# Patient Record
Sex: FEMALE | Race: ASIAN | NOT HISPANIC OR LATINO | URBAN - METROPOLITAN AREA
[De-identification: names, ages, dates, MRNs, and addresses within clinical notes are randomized per-mention and may not be internally consistent; named-entity substitution may affect disease eponyms.]

---

## 2024-03-05 ENCOUNTER — EMERGENCY (EMERGENCY)
Facility: HOSPITAL | Age: 39
LOS: 1 days | Discharge: HOME | End: 2024-03-05
Attending: EMERGENCY MEDICINE
Payer: COMMERCIAL

## 2024-03-05 VITALS
SYSTOLIC BLOOD PRESSURE: 118 MMHG | WEIGHT: 181 LBS | HEIGHT: 67 IN | OXYGEN SATURATION: 99 % | RESPIRATION RATE: 18 BRPM | DIASTOLIC BLOOD PRESSURE: 77 MMHG | TEMPERATURE: 99 F | HEART RATE: 88 BPM

## 2024-03-05 VITALS
RESPIRATION RATE: 16 BRPM | SYSTOLIC BLOOD PRESSURE: 112 MMHG | HEART RATE: 77 BPM | DIASTOLIC BLOOD PRESSURE: 71 MMHG | OXYGEN SATURATION: 99 %

## 2024-03-05 LAB
ALBUMIN SERPL ELPH-MCNC: 4.5 G/DL — SIGNIFICANT CHANGE UP (ref 3.3–5)
ALP SERPL-CCNC: 101 U/L — SIGNIFICANT CHANGE UP (ref 40–120)
ALT FLD-CCNC: 72 U/L — HIGH (ref 10–45)
ANION GAP SERPL CALC-SCNC: 12 MMOL/L — SIGNIFICANT CHANGE UP (ref 5–17)
APTT BLD: 36.5 SEC — HIGH (ref 24.5–35.6)
AST SERPL-CCNC: 45 U/L — HIGH (ref 10–40)
BASOPHILS # BLD AUTO: 0.03 K/UL — SIGNIFICANT CHANGE UP (ref 0–0.2)
BASOPHILS NFR BLD AUTO: 0.4 % — SIGNIFICANT CHANGE UP (ref 0–2)
BILIRUB SERPL-MCNC: 0.4 MG/DL — SIGNIFICANT CHANGE UP (ref 0.2–1.2)
BUN SERPL-MCNC: 8 MG/DL — SIGNIFICANT CHANGE UP (ref 7–23)
CALCIUM SERPL-MCNC: 9.6 MG/DL — SIGNIFICANT CHANGE UP (ref 8.4–10.5)
CHLORIDE SERPL-SCNC: 103 MMOL/L — SIGNIFICANT CHANGE UP (ref 96–108)
CO2 SERPL-SCNC: 23 MMOL/L — SIGNIFICANT CHANGE UP (ref 22–31)
CREAT SERPL-MCNC: 0.84 MG/DL — SIGNIFICANT CHANGE UP (ref 0.5–1.3)
EGFR: 91 ML/MIN/1.73M2 — SIGNIFICANT CHANGE UP
EOSINOPHIL # BLD AUTO: 0.14 K/UL — SIGNIFICANT CHANGE UP (ref 0–0.5)
EOSINOPHIL NFR BLD AUTO: 2.1 % — SIGNIFICANT CHANGE UP (ref 0–6)
GLUCOSE SERPL-MCNC: 99 MG/DL — SIGNIFICANT CHANGE UP (ref 70–99)
HCG SERPL-ACNC: <2 MIU/ML — SIGNIFICANT CHANGE UP
HCT VFR BLD CALC: 37.3 % — SIGNIFICANT CHANGE UP (ref 34.5–45)
HGB BLD-MCNC: 12.5 G/DL — SIGNIFICANT CHANGE UP (ref 11.5–15.5)
IMM GRANULOCYTES NFR BLD AUTO: 0.3 % — SIGNIFICANT CHANGE UP (ref 0–0.9)
INR BLD: 1.06 RATIO — SIGNIFICANT CHANGE UP (ref 0.85–1.18)
LYMPHOCYTES # BLD AUTO: 1.84 K/UL — SIGNIFICANT CHANGE UP (ref 1–3.3)
LYMPHOCYTES # BLD AUTO: 27.4 % — SIGNIFICANT CHANGE UP (ref 13–44)
MCHC RBC-ENTMCNC: 29.7 PG — SIGNIFICANT CHANGE UP (ref 27–34)
MCHC RBC-ENTMCNC: 33.5 GM/DL — SIGNIFICANT CHANGE UP (ref 32–36)
MCV RBC AUTO: 88.6 FL — SIGNIFICANT CHANGE UP (ref 80–100)
MONOCYTES # BLD AUTO: 0.31 K/UL — SIGNIFICANT CHANGE UP (ref 0–0.9)
MONOCYTES NFR BLD AUTO: 4.6 % — SIGNIFICANT CHANGE UP (ref 2–14)
NEUTROPHILS # BLD AUTO: 4.37 K/UL — SIGNIFICANT CHANGE UP (ref 1.8–7.4)
NEUTROPHILS NFR BLD AUTO: 65.2 % — SIGNIFICANT CHANGE UP (ref 43–77)
NRBC # BLD: 0 /100 WBCS — SIGNIFICANT CHANGE UP (ref 0–0)
PLATELET # BLD AUTO: 229 K/UL — SIGNIFICANT CHANGE UP (ref 150–400)
POTASSIUM SERPL-MCNC: 3.9 MMOL/L — SIGNIFICANT CHANGE UP (ref 3.5–5.3)
POTASSIUM SERPL-SCNC: 3.9 MMOL/L — SIGNIFICANT CHANGE UP (ref 3.5–5.3)
PROT SERPL-MCNC: 7.7 G/DL — SIGNIFICANT CHANGE UP (ref 6–8.3)
PROTHROM AB SERPL-ACNC: 11.6 SEC — SIGNIFICANT CHANGE UP (ref 9.5–13)
RBC # BLD: 4.21 M/UL — SIGNIFICANT CHANGE UP (ref 3.8–5.2)
RBC # FLD: 13.4 % — SIGNIFICANT CHANGE UP (ref 10.3–14.5)
SODIUM SERPL-SCNC: 138 MMOL/L — SIGNIFICANT CHANGE UP (ref 135–145)
WBC # BLD: 6.71 K/UL — SIGNIFICANT CHANGE UP (ref 3.8–10.5)
WBC # FLD AUTO: 6.71 K/UL — SIGNIFICANT CHANGE UP (ref 3.8–10.5)

## 2024-03-05 PROCEDURE — 85025 COMPLETE CBC W/AUTO DIFF WBC: CPT

## 2024-03-05 PROCEDURE — 85730 THROMBOPLASTIN TIME PARTIAL: CPT

## 2024-03-05 PROCEDURE — 80053 COMPREHEN METABOLIC PANEL: CPT

## 2024-03-05 PROCEDURE — 99284 EMERGENCY DEPT VISIT MOD MDM: CPT | Mod: 25

## 2024-03-05 PROCEDURE — 99285 EMERGENCY DEPT VISIT HI MDM: CPT

## 2024-03-05 PROCEDURE — 84702 CHORIONIC GONADOTROPIN TEST: CPT

## 2024-03-05 PROCEDURE — 70450 CT HEAD/BRAIN W/O DYE: CPT | Mod: 26,MC,59

## 2024-03-05 PROCEDURE — 70450 CT HEAD/BRAIN W/O DYE: CPT | Mod: MC

## 2024-03-05 PROCEDURE — 85610 PROTHROMBIN TIME: CPT

## 2024-03-05 PROCEDURE — 36415 COLL VENOUS BLD VENIPUNCTURE: CPT

## 2024-03-05 PROCEDURE — 70496 CT ANGIOGRAPHY HEAD: CPT | Mod: 26,MC

## 2024-03-05 PROCEDURE — 70496 CT ANGIOGRAPHY HEAD: CPT | Mod: MC

## 2024-03-05 NOTE — ED PROVIDER NOTE - NSFOLLOWUPINSTRUCTIONS_ED_ALL_ED_FT
You were seen in the ED for your symptoms and had blood work and CT scans performed. Your most likely diagnosis is Ménière's disease.  Please see the handout for additional information on Ménière's disease.    Please follow-up with ENT as soon as possible, our referral team will reach out to you to help you schedule an appointment.    Please seek immediate medical attention or return to the ED if you have any new or worsening symptoms.

## 2024-03-05 NOTE — ED PROVIDER NOTE - CLINICAL SUMMARY MEDICAL DECISION MAKING FREE TEXT BOX
39-year-old female with history of hypothyroid on levothyroxine, presents after finishing antibiotic course for otitis media with dizziness, hearing loss, and facial numbness.  Patient and family recently ill with similar URI symptoms 2 weeks ago, 7 days ago patient developed left ear pain, with left-sided hearing loss, and worsening dizziness was evaluated outpatient prescribed Augmentin, and Cipro otic drops for 7-day course which she completed yesterday.  Over this time.  Patient has had improvement with the left-sided ear pain and with dizziness however still gets lightheaded with certain head position movements like leaning forward.  And additionally complaining of increasing left-sided facial numbness over the last several days.  Denies any headache, history of migraines, trauma, visual changes, fever/chills, weakness,, dysuria.  AVSS, patient well-appearing, ANO 3, + left-sided facial numbness in V1 through 3, remaining CN II through XII intact, no focal motor deficits, normal gait, normal speech, EOMI, PERRLA, right-sided EAC within normal limits with earwax normal TM on right side, left TM bulging, no erythema of EAC, no mastoid TTP B/L, no oropharyngeal exudate or erythema, uvula midline.  Concern for possible Ménière's disease versus labyrinthitis, versus less likely mastoiditis given lack of mastoid tenderness, rule out cavernous sinus thrombosis given facial numbness rule out intracranial mass/hemorrhage however unlikely given history of recent infection.  Will get labs, CT head, CT venogram, and reassess after pharmacological intervention.

## 2024-03-05 NOTE — ED PROVIDER NOTE - PROGRESS NOTE DETAILS
Maurice Goodwin PGY3: pt reassessed, symptoms controlled, AVSS, no clinical signs of mastoiditis as per CT read, results discussed. ENT f/u discussed. Return precautions discussed, verbal understanding demonstrated, all questions answered.

## 2024-03-05 NOTE — ED PROVIDER NOTE - PHYSICAL EXAMINATION
ANO 3, + left-sided facial numbness in V1 through 3, remaining CN II through XII intact, no focal motor deficits, normal gait, normal speech, EOMI, PERRLA, right-sided EAC within normal limits with earwax normal TM on right side, left TM bulging, no erythema of EAC, no mastoid TTP B/L, no oropharyngeal exudate or erythema, uvula midline.

## 2024-03-05 NOTE — ED PROVIDER NOTE - PATIENT PORTAL LINK FT
You can access the FollowMyHealth Patient Portal offered by [facility] by registering at the following website: [email or web address]. By joining [facility]’s FollowMyHealth portal, you will also be able to view your health information using other applications (apps) compatible with our system.

## 2024-03-05 NOTE — ED ADULT TRIAGE NOTE - CHIEF COMPLAINT QUOTE
Freq c/o dizziness since 2/26 seen and tx Feels numbness since then also BEFAST neg  Seen at Urgent care tx for l ear infection  C/o numbness to l ear and rt ear at times  Has h/o Hypothyroidism on Levothyroxone

## 2024-03-05 NOTE — ED PROVIDER NOTE - ATTENDING CONTRIBUTION TO CARE
Attending Statement (LAWRENCE Earl MD):    HPI: 39-year-old female with history of hypothyroidism presenting with dizziness decreased hearing and left facial numbness.  Patient states she had had congestion and left ear pain and had been treated with a course of Augmentin and Cipro drops.  However while we ear pain is resolved and congestion since last send has noted some decreased hearing and at times hearing "instruments "(described as high-pitched tones) and having some numbness of the left face.  Further questioning the numbness is not lack of sensation just at the left side of the face particularly around ear feels different.  No fever.  No pain in ear or jaw presently.  Is not having a headache at present.      Review of Systems:  -General: no fever   -ENT: See HPI  -Pulmonary: no cough, no shortness of breath  -Cardiac: no chest pain  -Gastrointestinal: no abdominal pain, no nausea, no vomiting, and no diarrhea.  -Genitourinary: no blood or pain with urination  -Musculoskeletal: no back or neck pain  -Skin: no rashes  -Endocrine: No h/o diabetes  -Neurologic: No new weakness or numbness in extremities    All else negative unless otherwise specified elsewhere in this note.    On Physical Exam:  General: well appearing, in NAD, speaking clearly in full sentences and without difficulty; cooperative with exam  HEENT: anicteric sclera, airway patent; TM intact bilaterally right is normal with normal external auditory canal.  Left mild bulging of left TM but no pus no erythema.  No vesicles in either external auditory canals.  No mastoid tenderness bilaterally.  No swelling or raising of the ears bilaterally.  Neck: no JVD  Cardiac: regular, s1 s2  Lungs: CTABL  Abdomen: soft nontender/nondistended  : no bladder tenderness or distension  Skin: intact, no rash  Extremities: no peripheral edema, no gross deformities  Neuro: CN II through XII grossly intact no loss of sensation in facial nerve distribution regions no facial weakness or asymmetry.  Hearing grossly intact on bedside exam.  No weakness or numbness in extremities no rigidity or tremors.  Normal gait.  No elicited nystagmus on exam.    MDM: 39 female history of hypothyroid presenting with multiple symptoms most consistent with Ménière's disease likely secondary to resolving ear infection.  Will also consider evaluation for cavernous sinus thrombosis given recent infection and unilateral neurosymptoms.  Obtain CT and CTV.  Teen screening labs CBC CMP hCG to rule out pregnancy though low suspicion.  Pending review of labs and imaging if no evidence of space-occupying lesion or thrombus can discharge with outpatient follow-up through ENT.

## 2024-03-05 NOTE — ED ADULT NURSE NOTE - OBJECTIVE STATEMENT
Pt is a 39 yr old female coming from home for dizziness- pt had a left sided ear infection about a week ago- and has had episodes of dizziness since then. Pt states last week she had three days of consistent dizziness- but today it has improved but happens every time she moves her head. Pt is a/ox 3- vitals stable.

## 2024-03-12 PROBLEM — Z00.00 ENCOUNTER FOR PREVENTIVE HEALTH EXAMINATION: Status: ACTIVE | Noted: 2024-03-12

## 2024-06-19 ENCOUNTER — APPOINTMENT (OUTPATIENT)
Dept: OTOLARYNGOLOGY | Facility: CLINIC | Age: 39
End: 2024-06-19

## 2024-06-19 VITALS
HEART RATE: 72 BPM | DIASTOLIC BLOOD PRESSURE: 71 MMHG | HEIGHT: 66 IN | BODY MASS INDEX: 27.32 KG/M2 | SYSTOLIC BLOOD PRESSURE: 103 MMHG | WEIGHT: 170 LBS

## 2024-06-19 DIAGNOSIS — Z83.3 FAMILY HISTORY OF DIABETES MELLITUS: ICD-10-CM

## 2024-06-19 DIAGNOSIS — Z86.39 PERSONAL HISTORY OF OTHER ENDOCRINE, NUTRITIONAL AND METABOLIC DISEASE: ICD-10-CM

## 2024-06-19 DIAGNOSIS — Z83.49 FAMILY HISTORY OF OTHER ENDOCRINE, NUTRITIONAL AND METABOLIC DISEASES: ICD-10-CM

## 2024-06-19 DIAGNOSIS — Z82.49 FAMILY HISTORY OF ISCHEMIC HEART DISEASE AND OTHER DISEASES OF THE CIRCULATORY SYSTEM: ICD-10-CM

## 2024-06-19 PROCEDURE — 99203 OFFICE O/P NEW LOW 30 MIN: CPT

## 2024-06-19 PROCEDURE — 92567 TYMPANOMETRY: CPT

## 2024-06-19 PROCEDURE — 92557 COMPREHENSIVE HEARING TEST: CPT

## 2024-06-19 RX ORDER — LEVOTHYROXINE SODIUM 0.17 MG/1
TABLET ORAL
Refills: 0 | Status: ACTIVE | COMMUNITY

## 2024-06-19 NOTE — ASSESSMENT
[FreeTextEntry1] : 39 year F  present with  likely Left Otitis media with middle ear effusion with now resolution with No numbness no further dizziness  Recommend Left Mild hearing loss  -Return to clinic in 1 week or sooner if new/worsen symptoms present

## 2024-06-19 NOTE — HISTORY OF PRESENT ILLNESS
[de-identified] : 39 year old female referred by [facility] to r/o Minieres disease. Patient had the Flu in March - with nasal congestion / mucus. Reports loud pop in the left ear while blowing her nose.  Followed by left otalgia, decreased hearing, dizziness and ringing in the left ear. States symptoms progressed for several days which prompted ED visit on 3/5/24. CT showed left mastoid air cell and epitympanic effusion. Discharged with Augmentin, Ibuprofen and Ofloxacin drops. Otalgia and tinnitus have resolved. No longer having dizziness.  Patient feels that she is hearing within normal limits.

## 2024-12-31 ENCOUNTER — APPOINTMENT (OUTPATIENT)
Dept: OTOLARYNGOLOGY | Facility: CLINIC | Age: 39
End: 2024-12-31
Payer: COMMERCIAL

## 2024-12-31 VITALS
OXYGEN SATURATION: 99 % | SYSTOLIC BLOOD PRESSURE: 113 MMHG | TEMPERATURE: 97.2 F | BODY MASS INDEX: 27.32 KG/M2 | WEIGHT: 170 LBS | HEIGHT: 66 IN | HEART RATE: 80 BPM | DIASTOLIC BLOOD PRESSURE: 76 MMHG

## 2024-12-31 DIAGNOSIS — M26.609 UNSPECIFIED TEMPOROMANDIBULAR JOINT DISORDER: ICD-10-CM

## 2024-12-31 DIAGNOSIS — H92.02 OTALGIA, LEFT EAR: ICD-10-CM

## 2024-12-31 DIAGNOSIS — H90.3 SENSORINEURAL HEARING LOSS, BILATERAL: ICD-10-CM

## 2024-12-31 DIAGNOSIS — H93.292 OTHER ABNORMAL AUDITORY PERCEPTIONS, LEFT EAR: ICD-10-CM

## 2024-12-31 PROCEDURE — 99213 OFFICE O/P EST LOW 20 MIN: CPT | Mod: 25

## 2024-12-31 PROCEDURE — 31575 DIAGNOSTIC LARYNGOSCOPY: CPT

## 2025-01-03 PROBLEM — H93.292 ABNORMAL AUDITORY PERCEPTION OF LEFT EAR: Status: ACTIVE | Noted: 2024-07-05

## 2025-01-21 ENCOUNTER — APPOINTMENT (OUTPATIENT)
Dept: OTOLARYNGOLOGY | Facility: CLINIC | Age: 40
End: 2025-01-21